# Patient Record
Sex: FEMALE | Race: BLACK OR AFRICAN AMERICAN | NOT HISPANIC OR LATINO | URBAN - METROPOLITAN AREA
[De-identification: names, ages, dates, MRNs, and addresses within clinical notes are randomized per-mention and may not be internally consistent; named-entity substitution may affect disease eponyms.]

---

## 2025-01-22 ENCOUNTER — APPOINTMENT (OUTPATIENT)
Dept: URGENT CARE | Age: 40
End: 2025-01-22

## 2025-07-16 ENCOUNTER — PHARMACY VISIT (OUTPATIENT)
Dept: PHARMACY | Facility: CLINIC | Age: 40
End: 2025-07-16
Payer: COMMERCIAL

## 2025-07-16 ENCOUNTER — OFFICE VISIT (OUTPATIENT)
Dept: PRIMARY CARE | Facility: HOSPITAL | Age: 40
End: 2025-07-16
Payer: COMMERCIAL

## 2025-07-16 VITALS
WEIGHT: 253 LBS | HEIGHT: 63 IN | TEMPERATURE: 96.3 F | OXYGEN SATURATION: 98 % | HEART RATE: 112 BPM | BODY MASS INDEX: 44.83 KG/M2

## 2025-07-16 DIAGNOSIS — B35.6 TINEA CRURIS: ICD-10-CM

## 2025-07-16 DIAGNOSIS — Z23 NEED FOR VARICELLA VACCINE: ICD-10-CM

## 2025-07-16 DIAGNOSIS — Z00.00 HEALTH MAINTENANCE EXAMINATION: Primary | ICD-10-CM

## 2025-07-16 DIAGNOSIS — E11.9 TYPE 2 DIABETES MELLITUS WITHOUT COMPLICATION, WITH LONG-TERM CURRENT USE OF INSULIN: ICD-10-CM

## 2025-07-16 DIAGNOSIS — E78.2 MIXED HYPERLIPIDEMIA: ICD-10-CM

## 2025-07-16 DIAGNOSIS — Z23 NEED FOR TDAP VACCINATION: ICD-10-CM

## 2025-07-16 DIAGNOSIS — Z79.4 TYPE 2 DIABETES MELLITUS WITHOUT COMPLICATION, WITH LONG-TERM CURRENT USE OF INSULIN: ICD-10-CM

## 2025-07-16 LAB — GLUCOSE BLD MANUAL STRIP-MCNC: 289 MG/DL (ref 74–99)

## 2025-07-16 PROCEDURE — 82947 ASSAY GLUCOSE BLOOD QUANT: CPT | Performed by: STUDENT IN AN ORGANIZED HEALTH CARE EDUCATION/TRAINING PROGRAM

## 2025-07-16 PROCEDURE — RXMED WILLOW AMBULATORY MEDICATION CHARGE

## 2025-07-16 RX ORDER — KETOCONAZOLE 20 MG/G
CREAM TOPICAL 2 TIMES DAILY
Qty: 30 G | Refills: 0 | Status: SHIPPED | OUTPATIENT
Start: 2025-07-16 | End: 2025-07-30

## 2025-07-16 RX ORDER — DULAGLUTIDE 1.5 MG/.5ML
1.5 INJECTION, SOLUTION SUBCUTANEOUS WEEKLY
Qty: 2 ML | Refills: 5 | Status: SHIPPED | OUTPATIENT
Start: 2025-07-16

## 2025-07-16 RX ORDER — ATORVASTATIN CALCIUM 40 MG/1
40 TABLET, FILM COATED ORAL DAILY
Qty: 100 TABLET | Refills: 2 | Status: SHIPPED | OUTPATIENT
Start: 2025-07-16 | End: 2026-05-12

## 2025-07-16 RX ORDER — METFORMIN HYDROCHLORIDE 500 MG/1
500 TABLET ORAL
Qty: 200 TABLET | Refills: 2 | Status: SHIPPED | OUTPATIENT
Start: 2025-07-16 | End: 2026-05-12

## 2025-07-16 ASSESSMENT — ENCOUNTER SYMPTOMS
DEPRESSION: 0
OCCASIONAL FEELINGS OF UNSTEADINESS: 0
LOSS OF SENSATION IN FEET: 0

## 2025-07-16 ASSESSMENT — PATIENT HEALTH QUESTIONNAIRE - PHQ9
SUM OF ALL RESPONSES TO PHQ9 QUESTIONS 1 AND 2: 0
2. FEELING DOWN, DEPRESSED OR HOPELESS: NOT AT ALL
1. LITTLE INTEREST OR PLEASURE IN DOING THINGS: NOT AT ALL

## 2025-07-16 NOTE — PROGRESS NOTES
"  [facility]    HPI:  Maryan French is a 40 y.o. female w/ a PMH s/f obesity, HLD, T2DM (on insulin), multiple vaginal yeast infections since 2021 presenting for a New Patient Visit to Establish Care.    BP not charted in visit encounter- I checked manually, and it was 112/76.    Interval History Since Last Visit (per chart review):    Patient presented to [place] urgent care with bacterial vaginosis and known exposure to chlamydia (after her partner tested positive). Patient underwent empiric treatment for chlamydia/gonorrhea with IV ceftriaxone and PO doxycyline, also received metronidazole for bacterial vaginosis.     Patient was seen at [facility] in September 2023 and per chart review, was on insulin 15 units glargine daily, metformin 1000 mg daily, atorvastatin 40 mg daily. Patient has not had any medications for at least a few months (since March 2025). Patient moved into [facility] in December 2024; patient currently works as a night shift  in [place].    Today Per Patient:  Patient reports that itching/tenderness in her groin area has been ongoing for 2 weeks since she completed her course of antibiotics from the urgent care. She denies fever, chills, chest pain or dyspnea.     Health behaviors:  Work:   Alcohol: 1 or 2 drinks a week  Tobacco: smoke cigarettes (1/2 pack, ~10-20 years)  Other drugs: None   Diet/Exercise: Works overnight so eating habits are \"off\"; buys what she can afford, no fast food or pop. Stays away from bread but eats pasta and rice. Eats fruit,not a lot of vegetables.  Vision Exams: last checked a few years ago  Dental Exams: last <1 year ago  Patient has 2 children, had a total of 3 pregnancies. 1 sexual partner had intercourse again and he reported treatment    Safety:  Housing Insecurity: [address]  Food Insecurity: can't afford food sometimes  Weapons in the home: None  Objective   Past Medical History: Medical " History[1]    LMP: No LMP recorded. Last month (-)    Allergies: RX Allergies[2] Allergies to sulfur products- breaking out in hives/rash, unclear     Surgical History: Surgical History[3]  x 3. Third child had cerebral palsy and is in state.    Family History: Family History[4]  Lives with 2 kids. Mom had liver disease (possible autoimmune disease), passed away from this. Maternal grandmother (passed) and maternal aunt had paranoid schizophrenia. Maternal grandmother had diabetes. Maternal great aunt had breast cancer and  from it.    Social history:  reports that she has been smoking cigarettes. She has never used smokeless tobacco. She reports that she does not currently use alcohol. She reports current drug use. Drug: Marijuana.   Smokes marijuana on occasion.     Social History     Social History Narrative   • Not on file       Medications:Current Medications[5]    Vitals: There were no vitals filed for this visit.    Physical exam:  Constitutional: Well-developed female in no acute distress.  HEENT: NC/AT, sclera anicteric  Respiratory: CTAB. No wheezes, rales, or rhonchi. Normal respiratory effort.  Cardiovascular: Regular rhythm. No murmurs, gallops, or rubs. Mild tachycardia.   Abdominal: Soft, nondistended, nontender to palpation. Bowel sounds present. No hepatosplenomegaly or masses.   Neuro: AAOx3. CN II-XII grossly intact. Tongue midline, no facial droop  MSK: No LE edema bilaterally. Moving extremities well  Skin: Warm, dry. Area of erythema and tenderness just adjacent to vagina near groin area, no irregular discharge.   Psych: Appropriate mood and affect.    Labs: No results found for this or any previous visit (from the past 24 hours).    Imaging: Imaging  No results found.    Cardiology, Vascular, and Other Imaging  No other imaging results found for the past 7 days      Assessment and Plan   Maryan French is a 40 y.o. female w/ a PMH s/f obesity, HLD, T2DM (on  insulin), multiple vaginal yeast infections since 2021 presenting for a New Patient Visit to Establish Care.    Refills Provided This Visit:    - atorvastatin  - trulicity  - metformin     Plan:    #Health Maintenance  - no recent labs/tests, so obtain the following:  - CBC  - CMP, Mg, Phos  - HBA1c  - Lipid panel  - Vitamin D level  - TSH  - syphilis, HIV, hepatitis C tests  - mammogram   - obtain Tdap and Varicella vaccines    #Tinea cruris  - likely from recent ABX use and uncontrolled diabetes  - prescribed ketoconazole 2% cream daily for 2 weeks to groin, nasrin-vaginal region    #T2DM  #Obesity  - patient reports that due to significant life stressors and changes in job, insurance, she has not been able to take any of her home medications, including her insulin, metformin, and trulicity since at least March 2025  - obtain HBA1c  - resume trulicity 1.5 mg weekly  - resume metformin 500 mg BID  - pending workup, will discuss re-initiation of insulin at next visit in 2-3 months    #HLD  - obtain lipid panel   - resume home atorvastatin 40 mg daily     #Health Maintenance  DEXA: not yet indicated   Cancer Screening  Colorectal Cancer Screening: not yet indicated  Lung Cancer Screening: not yet indicated (Indicated age 50-80 20yr smoking hx current OR quit w/in last 15yrs yearly)  Pap testing in 3 years, mammogram Needed (ordered) (Mammogram (ACOG rec age 40, USPSTF age 50: shared decision making 40-50 then q1-2 yrs until age 74 and then shared decision making)  Laboratory Screening  Lipid Screen: pending  ASCVD Score: pending  HbA1C: pending   Infectious Screening  Syphilis: pending  HIV: pending  Gonorrhea: recently treated  Chlamydia: recently treated  Hep B screen: will discuss at next visit   Hep C screen: pending  Immunizations  Influenza: not done  COVID: was vaccinated in May 2022  Tdap: ordered  Prevnar/Pneumovax: not yet indicated adult <65 w/ risk factors (heart/liver/lung disease; smoking, diabetes) OR  >65 unknown vaccine hx PCV 20 -> 1yr -> PPSV 23; PPSV23 -> 1yr -> PCV 20   Shingrix: not yet indicated >49yo 2 doses 2-6mo apart. Varicella ordered.    Follow-up in 2-3 months.    Patient and plan discussed with attending physician Dr. Lovell.    Devon Palma MD  Internal Medicine, PGY-2  [facility]            [1]  No past medical history on file.  [2]  No Known Allergies  [3]  Past Surgical History:  Procedure Laterality Date   • OTHER SURGICAL HISTORY  2019     section   [4]  No family history on file.  [5]  No current outpatient medications on file.

## 2025-07-16 NOTE — PATIENT INSTRUCTIONS
- Obtain labs at [facility] location (we will follow up results and get back to you)    - Obtain screening mammogram     - Follow up in [facility] clinic in 2-3 months (we will notify you of specific date)    - Take trulicity and metformin as prescribed for now; we will discuss insulin resumption at future visit

## 2025-07-17 LAB
25(OH)D3+25(OH)D2 SERPL-MCNC: 18 NG/ML (ref 30–100)
ALBUMIN SERPL-MCNC: 4.2 G/DL (ref 3.6–5.1)
ALBUMIN/CREAT UR: 3 MG/G CREAT
ALP SERPL-CCNC: 63 U/L (ref 31–125)
ALT SERPL-CCNC: 9 U/L (ref 6–29)
ANION GAP SERPL CALCULATED.4IONS-SCNC: 12 MMOL/L (CALC) (ref 7–17)
AST SERPL-CCNC: 9 U/L (ref 10–30)
BASOPHILS # BLD AUTO: 30 CELLS/UL (ref 0–200)
BASOPHILS NFR BLD AUTO: 0.4 %
BILIRUB SERPL-MCNC: 0.4 MG/DL (ref 0.2–1.2)
BUN SERPL-MCNC: 10 MG/DL (ref 7–25)
CALCIUM SERPL-MCNC: 9.3 MG/DL (ref 8.6–10.2)
CHLORIDE SERPL-SCNC: 100 MMOL/L (ref 98–110)
CHOLEST SERPL-MCNC: 215 MG/DL
CHOLEST/HDLC SERPL: 5.5 (CALC)
CO2 SERPL-SCNC: 21 MMOL/L (ref 20–32)
CREAT SERPL-MCNC: 0.81 MG/DL (ref 0.5–0.99)
CREAT UR-MCNC: 72 MG/DL (ref 20–275)
EGFRCR SERPLBLD CKD-EPI 2021: 94 ML/MIN/1.73M2
EOSINOPHIL # BLD AUTO: 84 CELLS/UL (ref 15–500)
EOSINOPHIL NFR BLD AUTO: 1.1 %
ERYTHROCYTE [DISTWIDTH] IN BLOOD BY AUTOMATED COUNT: 13.1 % (ref 11–15)
EST. AVERAGE GLUCOSE BLD GHB EST-MCNC: 278 MG/DL
EST. AVERAGE GLUCOSE BLD GHB EST-SCNC: 15.4 MMOL/L
GLUCOSE SERPL-MCNC: 414 MG/DL (ref 65–99)
HBA1C MFR BLD: 11.3 %
HCT VFR BLD AUTO: 39 % (ref 35–45)
HCV RNA SERPL NAA+PROBE-ACNC: NORMAL IU/ML
HCV RNA SERPL NAA+PROBE-LOG IU: NORMAL LOG IU/ML
HDLC SERPL-MCNC: 39 MG/DL
HGB BLD-MCNC: 12.9 G/DL (ref 11.7–15.5)
HIV 1+2 AB+HIV1 P24 AG SERPL QL IA: NORMAL
HIV 1+2 AB+HIV1 P24 AG SERPL QL IA: NORMAL
LDLC SERPL CALC-MCNC: 153 MG/DL (CALC)
LYMPHOCYTES # BLD AUTO: 1512 CELLS/UL (ref 850–3900)
LYMPHOCYTES NFR BLD AUTO: 19.9 %
MAGNESIUM SERPL-MCNC: 1.6 MG/DL (ref 1.5–2.5)
MCH RBC QN AUTO: 29.9 PG (ref 27–33)
MCHC RBC AUTO-ENTMCNC: 33.1 G/DL (ref 32–36)
MCV RBC AUTO: 90.3 FL (ref 80–100)
MICROALBUMIN UR-MCNC: 0.2 MG/DL
MONOCYTES # BLD AUTO: 593 CELLS/UL (ref 200–950)
MONOCYTES NFR BLD AUTO: 7.8 %
NEUTROPHILS # BLD AUTO: 5381 CELLS/UL (ref 1500–7800)
NEUTROPHILS NFR BLD AUTO: 70.8 %
NONHDLC SERPL-MCNC: 176 MG/DL (CALC)
PHOSPHATE SERPL-MCNC: 4.1 MG/DL (ref 2.5–4.5)
PLATELET # BLD AUTO: 390 THOUSAND/UL (ref 140–400)
PMV BLD REES-ECKER: 10.5 FL (ref 7.5–12.5)
POTASSIUM SERPL-SCNC: 4.1 MMOL/L (ref 3.5–5.3)
PROT SERPL-MCNC: 7.2 G/DL (ref 6.1–8.1)
RBC # BLD AUTO: 4.32 MILLION/UL (ref 3.8–5.1)
SODIUM SERPL-SCNC: 133 MMOL/L (ref 135–146)
T PALLIDUM AB SER QL IA: NORMAL
TRIGL SERPL-MCNC: 117 MG/DL
TSH SERPL-ACNC: 1.95 MIU/L
WBC # BLD AUTO: 7.6 THOUSAND/UL (ref 3.8–10.8)

## 2025-07-17 NOTE — PROGRESS NOTES
Below are notable labs for Ms. French:     - CMP shows elevated non-fasting glucose 414, and HBA1c is also elevated at 11.3 (was last 11.1 in Sept 2023)   - lipid panel is cholesterol 215, HDL 39,  (previously in Sept 2023 cholesterol was 242, HDL 39, )   - vitamin D of 18 ng/ml (Vit D deficiency)    Given medication non-compliance over the past 3-6 months, can continue atorvastatin at 40 mg daily for now.     Her HBA1c is very high and correlates with her insulin, trulicity, and metformin non-compliance.    Will continue trulicity 1.5 mg weekly and increase metformin from 500 mg BID to 500 mg TID, and re-assess her HBA1c in 3 months before discussing restarting insulin.     She has Vitamin D deficiency, for which we will give her vitamin D2 at 50,000 IU weekly for 8 weeks, followed by maintenance doses of vitamin D3 1000 IU daily.     Discussed with attending physician Dr. Lovell.     Devon Palma MD   PGY-1, Internal Medicine Resident  [facility]

## 2025-07-22 LAB
25(OH)D3+25(OH)D2 SERPL-MCNC: 18 NG/ML (ref 30–100)
ALBUMIN SERPL-MCNC: 4.2 G/DL (ref 3.6–5.1)
ALBUMIN/CREAT UR: 3 MG/G CREAT
ALP SERPL-CCNC: 63 U/L (ref 31–125)
ALT SERPL-CCNC: 9 U/L (ref 6–29)
ANION GAP SERPL CALCULATED.4IONS-SCNC: 12 MMOL/L (CALC) (ref 7–17)
AST SERPL-CCNC: 9 U/L (ref 10–30)
BASOPHILS # BLD AUTO: 30 CELLS/UL (ref 0–200)
BASOPHILS NFR BLD AUTO: 0.4 %
BILIRUB SERPL-MCNC: 0.4 MG/DL (ref 0.2–1.2)
BUN SERPL-MCNC: 10 MG/DL (ref 7–25)
CALCIUM SERPL-MCNC: 9.3 MG/DL (ref 8.6–10.2)
CHLORIDE SERPL-SCNC: 100 MMOL/L (ref 98–110)
CHOLEST SERPL-MCNC: 215 MG/DL
CHOLEST/HDLC SERPL: 5.5 (CALC)
CO2 SERPL-SCNC: 21 MMOL/L (ref 20–32)
CREAT SERPL-MCNC: 0.81 MG/DL (ref 0.5–0.99)
CREAT UR-MCNC: 72 MG/DL (ref 20–275)
EGFRCR SERPLBLD CKD-EPI 2021: 94 ML/MIN/1.73M2
EOSINOPHIL # BLD AUTO: 84 CELLS/UL (ref 15–500)
EOSINOPHIL NFR BLD AUTO: 1.1 %
ERYTHROCYTE [DISTWIDTH] IN BLOOD BY AUTOMATED COUNT: 13.1 % (ref 11–15)
EST. AVERAGE GLUCOSE BLD GHB EST-MCNC: 278 MG/DL
EST. AVERAGE GLUCOSE BLD GHB EST-SCNC: 15.4 MMOL/L
GLUCOSE SERPL-MCNC: 414 MG/DL (ref 65–99)
HBA1C MFR BLD: 11.3 %
HCT VFR BLD AUTO: 39 % (ref 35–45)
HCV RNA SERPL NAA+PROBE-ACNC: NORMAL IU/ML
HCV RNA SERPL NAA+PROBE-LOG IU: NORMAL LOG IU/ML
HDLC SERPL-MCNC: 39 MG/DL
HGB BLD-MCNC: 12.9 G/DL (ref 11.7–15.5)
HIV 1+2 AB+HIV1 P24 AG SERPL QL IA: NORMAL
HIV 1+2 AB+HIV1 P24 AG SERPL QL IA: NORMAL
LDLC SERPL CALC-MCNC: 153 MG/DL (CALC)
LYMPHOCYTES # BLD AUTO: 1512 CELLS/UL (ref 850–3900)
LYMPHOCYTES NFR BLD AUTO: 19.9 %
MAGNESIUM SERPL-MCNC: 1.6 MG/DL (ref 1.5–2.5)
MCH RBC QN AUTO: 29.9 PG (ref 27–33)
MCHC RBC AUTO-ENTMCNC: 33.1 G/DL (ref 32–36)
MCV RBC AUTO: 90.3 FL (ref 80–100)
MICROALBUMIN UR-MCNC: 0.2 MG/DL
MONOCYTES # BLD AUTO: 593 CELLS/UL (ref 200–950)
MONOCYTES NFR BLD AUTO: 7.8 %
NEUTROPHILS # BLD AUTO: 5381 CELLS/UL (ref 1500–7800)
NEUTROPHILS NFR BLD AUTO: 70.8 %
NONHDLC SERPL-MCNC: 176 MG/DL (CALC)
PHOSPHATE SERPL-MCNC: 4.1 MG/DL (ref 2.5–4.5)
PLATELET # BLD AUTO: 390 THOUSAND/UL (ref 140–400)
PMV BLD REES-ECKER: 10.5 FL (ref 7.5–12.5)
POTASSIUM SERPL-SCNC: 4.1 MMOL/L (ref 3.5–5.3)
PROT SERPL-MCNC: 7.2 G/DL (ref 6.1–8.1)
RBC # BLD AUTO: 4.32 MILLION/UL (ref 3.8–5.1)
SODIUM SERPL-SCNC: 133 MMOL/L (ref 135–146)
T PALLIDUM AB SER QL IA: NEGATIVE
TRIGL SERPL-MCNC: 117 MG/DL
TSH SERPL-ACNC: 1.95 MIU/L
WBC # BLD AUTO: 7.6 THOUSAND/UL (ref 3.8–10.8)